# Patient Record
Sex: MALE | Race: BLACK OR AFRICAN AMERICAN | NOT HISPANIC OR LATINO
[De-identification: names, ages, dates, MRNs, and addresses within clinical notes are randomized per-mention and may not be internally consistent; named-entity substitution may affect disease eponyms.]

---

## 2018-08-28 ENCOUNTER — RECORD ABSTRACTING (OUTPATIENT)
Age: 48
End: 2018-08-28

## 2018-08-28 DIAGNOSIS — Z87.891 PERSONAL HISTORY OF NICOTINE DEPENDENCE: ICD-10-CM

## 2018-08-28 DIAGNOSIS — G47.33 OBSTRUCTIVE SLEEP APNEA (ADULT) (PEDIATRIC): ICD-10-CM

## 2018-10-04 ENCOUNTER — EMERGENCY (EMERGENCY)
Facility: HOSPITAL | Age: 48
LOS: 1 days | Discharge: HOME | End: 2018-10-04
Attending: EMERGENCY MEDICINE | Admitting: EMERGENCY MEDICINE
Payer: COMMERCIAL

## 2018-10-04 VITALS
TEMPERATURE: 98 F | HEART RATE: 75 BPM | OXYGEN SATURATION: 99 % | DIASTOLIC BLOOD PRESSURE: 91 MMHG | RESPIRATION RATE: 16 BRPM | SYSTOLIC BLOOD PRESSURE: 135 MMHG

## 2018-10-04 PROCEDURE — 73140 X-RAY EXAM OF FINGER(S): CPT | Mod: 26,LT

## 2018-10-04 PROCEDURE — 99283 EMERGENCY DEPT VISIT LOW MDM: CPT

## 2018-10-04 RX ORDER — TETANUS TOXOID, REDUCED DIPHTHERIA TOXOID AND ACELLULAR PERTUSSIS VACCINE, ADSORBED 5; 2.5; 8; 8; 2.5 [IU]/.5ML; [IU]/.5ML; UG/.5ML; UG/.5ML; UG/.5ML
0.5 SUSPENSION INTRAMUSCULAR ONCE
Qty: 0 | Refills: 0 | Status: COMPLETED | OUTPATIENT
Start: 2018-10-04 | End: 2018-10-04

## 2018-10-04 RX ADMIN — TETANUS TOXOID, REDUCED DIPHTHERIA TOXOID AND ACELLULAR PERTUSSIS VACCINE, ADSORBED 0.5 MILLILITER(S): 5; 2.5; 8; 8; 2.5 SUSPENSION INTRAMUSCULAR at 22:53

## 2018-10-04 RX ADMIN — Medication 1 TABLET(S): at 23:16

## 2018-10-04 NOTE — ED PROVIDER NOTE - SKIN WOUND TYPE
PUNCTURE WOUND(S)/3mm x 3mm entry and exit wounds in left distal 3rd finger with no evidence of fracture

## 2018-10-04 NOTE — ED PROVIDER NOTE - OBJECTIVE STATEMENT
48M with no PMHx presents complaining of left middle finger pain/injury. Patient reports he was at work drilling through metal using a power drill with a small drill bit when he drilled through his middle finger around 12pm today. He states some metal fragments might have gone through the finger with the drill bit. Patient reports he was originally bleeding a lot but it has since slowed down. He states it is painful and mildly swollen. He states it has been at least 5 years since his last tetanus shot. He denies fevers/chills, numbness/tingling, shortness of breath, difficulty moving finger, pain anywhere else besides left middle finger, nausea/vomiting.

## 2018-10-04 NOTE — ED PROVIDER NOTE - ATTENDING CONTRIBUTION TO CARE
Seen and examined, NAD in ED, through and through injury to L 3rd digit distal phalanx of non-dominant hand. Pt. presents with puncture exit and entry wound through fingerpad, no active bleeding, unsure tetanus status. Pt. with full ROM all digits, no bony tenderness, NT IP joint, XR neg. for fx, no open fx, wound irrigated and cleaned, will not suture, will dress to heal by secondary intention.

## 2018-10-04 NOTE — ED ADULT TRIAGE NOTE - CHIEF COMPLAINT QUOTE
Patient was using a drill when the handle turned and the drill went through his left middle finger.  Minimal bleeding with 2 small wounds and swelling noted.  FROM.  Denies dizziness or weakness.  Last tetanus in 2012.

## 2018-10-04 NOTE — ED PROVIDER NOTE - CARDIAC, MLM
Normal rate, regular rhythm.  Heart sounds S1, S2.  No murmurs, rubs or gallops. Radial pulses intact b/l.

## 2018-10-04 NOTE — ED PROVIDER NOTE - MEDICAL DECISION MAKING DETAILS
48M with puncture injury entry & exit wounds in left distal 3rd finger with no evidence of fracture. Neurovascularly intact, full range of motion. Will get XR of left 3rd finger to r/o fracture or foreign body. Will give tetanus booster. Reassess.

## 2018-10-04 NOTE — ED PROVIDER NOTE - NEUROLOGICAL, MLM
Alert and oriented, no focal deficits, no motor or sensory deficits. Sensation intact left 3rd finger.

## 2018-10-04 NOTE — ED PROVIDER NOTE - MUSCULOSKELETAL, MLM
Spine appears normal, range of motion is not limited, no muscle or joint tenderness. Left distal 3rd finger swelling, mild tenderness to palpation. ROM left 3rd finger intact.

## 2018-10-04 NOTE — ED PROVIDER NOTE - PROGRESS NOTE DETAILS
Lucretiaano: wound soaked with sterile saline and betadine. dressed with sterile gauze, bacitracin, and tape. patient with no new complaints at this time. patient to be discharged to home with followup with hand/plastic surgery clinic. Lucretiaano: wound soaked with sterile saline and betadine. dressed with sterile gauze, xeroform, and tape. 1 dose of augmentin given in hospital. patient with no new complaints at this time. patient to be discharged to home with followup with hand/plastic surgery clinic, and outpatient augmentin course. patient indicates he understands and agrees with plan.

## 2018-10-12 ENCOUNTER — APPOINTMENT (OUTPATIENT)
Dept: PULMONOLOGY | Facility: CLINIC | Age: 48
End: 2018-10-12
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
